# Patient Record
Sex: MALE | Race: ASIAN | Employment: OTHER | URBAN - METROPOLITAN AREA
[De-identification: names, ages, dates, MRNs, and addresses within clinical notes are randomized per-mention and may not be internally consistent; named-entity substitution may affect disease eponyms.]

---

## 2018-05-01 ENCOUNTER — HOSPITAL ENCOUNTER (INPATIENT)
Facility: HOSPITAL | Age: 48
LOS: 1 days | Discharge: HOME | DRG: 247 | End: 2018-05-02
Attending: EMERGENCY MEDICINE | Admitting: HOSPITALIST

## 2018-05-01 ENCOUNTER — APPOINTMENT (OUTPATIENT)
Dept: INTERVENTIONAL RADIOLOGY/VASCULAR | Facility: HOSPITAL | Age: 48
DRG: 247 | End: 2018-05-01
Attending: INTERNAL MEDICINE

## 2018-05-01 ENCOUNTER — APPOINTMENT (OUTPATIENT)
Dept: GENERAL RADIOLOGY | Facility: HOSPITAL | Age: 48
DRG: 247 | End: 2018-05-01
Attending: EMERGENCY MEDICINE

## 2018-05-01 DIAGNOSIS — I20.0 UNSTABLE ANGINA (HCC): Primary | ICD-10-CM

## 2018-05-01 PROCEDURE — B2151ZZ FLUOROSCOPY OF LEFT HEART USING LOW OSMOLAR CONTRAST: ICD-10-PCS | Performed by: INTERNAL MEDICINE

## 2018-05-01 PROCEDURE — 4A023N7 MEASUREMENT OF CARDIAC SAMPLING AND PRESSURE, LEFT HEART, PERCUTANEOUS APPROACH: ICD-10-PCS | Performed by: INTERNAL MEDICINE

## 2018-05-01 PROCEDURE — 99221 1ST HOSP IP/OBS SF/LOW 40: CPT | Performed by: HOSPITALIST

## 2018-05-01 PROCEDURE — 027135Z DILATION OF CORONARY ARTERY, TWO ARTERIES WITH TWO DRUG-ELUTING INTRALUMINAL DEVICES, PERCUTANEOUS APPROACH: ICD-10-PCS | Performed by: INTERNAL MEDICINE

## 2018-05-01 PROCEDURE — 71046 X-RAY EXAM CHEST 2 VIEWS: CPT | Performed by: EMERGENCY MEDICINE

## 2018-05-01 PROCEDURE — B2111ZZ FLUOROSCOPY OF MULTIPLE CORONARY ARTERIES USING LOW OSMOLAR CONTRAST: ICD-10-PCS | Performed by: INTERNAL MEDICINE

## 2018-05-01 RX ORDER — PRASUGREL 10 MG/1
TABLET, FILM COATED ORAL
Status: COMPLETED
Start: 2018-05-01 | End: 2018-05-01

## 2018-05-01 RX ORDER — CLOPIDOGREL BISULFATE 75 MG/1
75 TABLET ORAL DAILY
Status: DISCONTINUED | OUTPATIENT
Start: 2018-05-02 | End: 2018-05-01

## 2018-05-01 RX ORDER — HEPARIN SODIUM AND DEXTROSE 10000; 5 [USP'U]/100ML; G/100ML
INJECTION INTRAVENOUS CONTINUOUS
Status: CANCELLED | OUTPATIENT
Start: 2018-05-01

## 2018-05-01 RX ORDER — HEPARIN SODIUM AND DEXTROSE 10000; 5 [USP'U]/100ML; G/100ML
1000 INJECTION INTRAVENOUS ONCE
Status: COMPLETED | OUTPATIENT
Start: 2018-05-01 | End: 2018-05-01

## 2018-05-01 RX ORDER — NITROGLYCERIN 0.4 MG/1
0.4 TABLET SUBLINGUAL ONCE
Status: COMPLETED | OUTPATIENT
Start: 2018-05-01 | End: 2018-05-01

## 2018-05-01 RX ORDER — HEPARIN SODIUM 5000 [USP'U]/ML
INJECTION, SOLUTION INTRAVENOUS; SUBCUTANEOUS
Status: COMPLETED
Start: 2018-05-01 | End: 2018-05-01

## 2018-05-01 RX ORDER — LIDOCAINE HYDROCHLORIDE 10 MG/ML
INJECTION, SOLUTION EPIDURAL; INFILTRATION; INTRACAUDAL; PERINEURAL
Status: COMPLETED
Start: 2018-05-01 | End: 2018-05-01

## 2018-05-01 RX ORDER — CHLORAL HYDRATE 500 MG
1000 CAPSULE ORAL DAILY
COMMUNITY

## 2018-05-01 RX ORDER — METOPROLOL TARTRATE 5 MG/5ML
INJECTION INTRAVENOUS
Status: COMPLETED
Start: 2018-05-01 | End: 2018-05-01

## 2018-05-01 RX ORDER — SODIUM CHLORIDE 9 MG/ML
INJECTION, SOLUTION INTRAVENOUS CONTINUOUS
Status: ACTIVE | OUTPATIENT
Start: 2018-05-01 | End: 2018-05-02

## 2018-05-01 RX ORDER — METOCLOPRAMIDE HYDROCHLORIDE 5 MG/ML
10 INJECTION INTRAMUSCULAR; INTRAVENOUS EVERY 8 HOURS PRN
Status: DISCONTINUED | OUTPATIENT
Start: 2018-05-01 | End: 2018-05-02

## 2018-05-01 RX ORDER — HEPARIN SODIUM 5000 [USP'U]/ML
5000 INJECTION INTRAVENOUS; SUBCUTANEOUS ONCE
Status: COMPLETED | OUTPATIENT
Start: 2018-05-01 | End: 2018-05-01

## 2018-05-01 RX ORDER — ONDANSETRON 2 MG/ML
4 INJECTION INTRAMUSCULAR; INTRAVENOUS EVERY 6 HOURS PRN
Status: DISCONTINUED | OUTPATIENT
Start: 2018-05-01 | End: 2018-05-02

## 2018-05-01 RX ORDER — PRASUGREL 10 MG/1
10 TABLET, FILM COATED ORAL DAILY
Status: DISCONTINUED | OUTPATIENT
Start: 2018-05-02 | End: 2018-05-02

## 2018-05-01 RX ORDER — ACETAMINOPHEN 325 MG/1
650 TABLET ORAL EVERY 6 HOURS PRN
Status: DISCONTINUED | OUTPATIENT
Start: 2018-05-01 | End: 2018-05-02

## 2018-05-01 RX ORDER — MIDAZOLAM HYDROCHLORIDE 1 MG/ML
INJECTION INTRAMUSCULAR; INTRAVENOUS
Status: COMPLETED
Start: 2018-05-01 | End: 2018-05-01

## 2018-05-01 RX ORDER — ASPIRIN 81 MG/1
324 TABLET, CHEWABLE ORAL ONCE
Status: COMPLETED | OUTPATIENT
Start: 2018-05-01 | End: 2018-05-01

## 2018-05-01 RX ORDER — LISINOPRIL 10 MG/1
10 TABLET ORAL DAILY
Status: DISCONTINUED | OUTPATIENT
Start: 2018-05-02 | End: 2018-05-02

## 2018-05-01 RX ORDER — ATORVASTATIN CALCIUM 40 MG/1
40 TABLET, FILM COATED ORAL NIGHTLY
Status: DISCONTINUED | OUTPATIENT
Start: 2018-05-01 | End: 2018-05-02

## 2018-05-01 RX ORDER — ASPIRIN 325 MG
325 TABLET ORAL DAILY
COMMUNITY
End: 2018-05-01

## 2018-05-01 RX ORDER — ASPIRIN 81 MG/1
81 TABLET, CHEWABLE ORAL DAILY
Status: DISCONTINUED | OUTPATIENT
Start: 2018-05-01 | End: 2018-05-02

## 2018-05-01 RX ORDER — POTASSIUM CHLORIDE 20 MEQ/1
40 TABLET, EXTENDED RELEASE ORAL EVERY 4 HOURS
Status: COMPLETED | OUTPATIENT
Start: 2018-05-01 | End: 2018-05-02

## 2018-05-01 RX ORDER — TETRACYCLINE HCL 500 MG
500 CAPSULE ORAL DAILY
COMMUNITY

## 2018-05-01 NOTE — PLAN OF CARE
NURSING ADMISSION NOTE      Patient admitted via cart  Oriented to room. Safety precautions initiated. Bed in low position. Call light in reach. Pt admitted from cath lab.   SANDRO PLVB OF RCA AND SANDRO DISTAL RCA/PROX PDA  FEMSTOP TO RIGHT GROIN IN PL

## 2018-05-01 NOTE — ED PROVIDER NOTES
Patient Seen in: [facility] Emergency Department    History   Patient presents with:  Dyspnea TERRIE SOB (respiratory)    Stated Complaint: sob    HPI    40-year-old male presents to the emergency department with complaints of initially what he stated Right arm)   Pulse 90   Temp 98.1 °F (36.7 °C) (Oral)   Resp 18   Ht 172.7 cm (5' 8\")   Wt 95.3 kg   SpO2 100%   BMI 31.93 kg/m²         Physical Exam   Constitutional: He is oriented to person, place, and time.  He appears well-developed and well-nourishe EKG    Rate, intervals and axes as noted on EKG Report.   Rate: 100  Rhythm: Sinus Rhythm  Reading: Nonspecific lateral ST changes but no acute elevation no old EKG for comparison           ED Course as of May 01 1744  ----------------------------------

## 2018-05-01 NOTE — ED INITIAL ASSESSMENT (HPI)
Pt here for SOB since last night.  Pt notes that he has intermittent chest pain that he rates as a four

## 2018-05-01 NOTE — H&P
[facility]  History and Physical     Steve Joann Patient Status:  Inpatient    1970 MRN [id number]   [facility] 8NE-A Attending Uzair Briseno MD   Hosp Day # 0 PCP Unknown Pcp     Chief Complaint: Chest pain, SOB    His wheezes. No rhonchi. Cardiovascular: S1, S2. Regular rate and rhythm. No murmurs, rubs or gallops. Equal pulses. Chest and Back: No tenderness or deformity. Abdomen: Soft, nontender, nondistended. Positive bowel sounds.  No rebound, guarding or organom

## 2018-05-01 NOTE — PROCEDURES
[facility] Cardiology      Procedure:  LHC, CORONARY ANGIO, LV ANGIO, SANDRO PLVB OF RCA. SANDRO DISTAL RCA/PROX PDA, PERCLOSE RFA      Findings    LVEF: 60%    LM: PLAQUE    LCx: 95% LARGE PL BRANCH    LAD:  55% MID    RCA:  100% PROX PLVB. 90 % OSTIAL PDA.     RES

## 2018-05-01 NOTE — CONSULTS
[facility] Cardiology Consultation    Olivia Cain Patient Status:  Emergency    1970 MRN [id number]   Location [address] [facility] Attending Concetta Salgado MD   Hosp Day # 0 PCP Unknown Pcp     Reason for Consultation:  Chest pain 14.4   HCT  40.6   PLT  203.0       Chem:  Recent Labs   Lab  05/01/18   1158   NA  138   K  3.3*   CL  104   CO2  25.0   BUN  12   CREATSERUM  0.90   ALT  40   AST  56*   ALB  3.4*       No results for input(s): INR in the last 72 hours.

## 2018-05-02 VITALS
TEMPERATURE: 98 F | WEIGHT: 210 LBS | RESPIRATION RATE: 18 BRPM | DIASTOLIC BLOOD PRESSURE: 65 MMHG | HEART RATE: 82 BPM | SYSTOLIC BLOOD PRESSURE: 107 MMHG | OXYGEN SATURATION: 100 % | BODY MASS INDEX: 31.83 KG/M2 | HEIGHT: 68 IN

## 2018-05-02 PROCEDURE — 99239 HOSP IP/OBS DSCHRG MGMT >30: CPT | Performed by: HOSPITALIST

## 2018-05-02 RX ORDER — METOPROLOL SUCCINATE 25 MG/1
25 TABLET, EXTENDED RELEASE ORAL
Status: DISCONTINUED | OUTPATIENT
Start: 2018-05-02 | End: 2018-05-02

## 2018-05-02 RX ORDER — METOPROLOL SUCCINATE 25 MG/1
25 TABLET, EXTENDED RELEASE ORAL DAILY
Qty: 30 TABLET | Refills: 0 | Status: SHIPPED | OUTPATIENT
Start: 2018-05-02

## 2018-05-02 RX ORDER — ASPIRIN 81 MG/1
81 TABLET, CHEWABLE ORAL DAILY
Qty: 30 TABLET | Refills: 0 | Status: SHIPPED | OUTPATIENT
Start: 2018-05-02

## 2018-05-02 RX ORDER — METOPROLOL SUCCINATE 25 MG/1
25 TABLET, EXTENDED RELEASE ORAL
Qty: 30 TABLET | Refills: 0 | Status: SHIPPED | OUTPATIENT
Start: 2018-05-02 | End: 2018-05-02

## 2018-05-02 RX ORDER — LISINOPRIL 10 MG/1
10 TABLET ORAL DAILY
Qty: 30 TABLET | Refills: 0 | Status: SHIPPED | OUTPATIENT
Start: 2018-05-02 | End: 2018-05-15

## 2018-05-02 RX ORDER — ATORVASTATIN CALCIUM 40 MG/1
40 TABLET, FILM COATED ORAL NIGHTLY
Qty: 30 TABLET | Refills: 0 | Status: SHIPPED | OUTPATIENT
Start: 2018-05-02

## 2018-05-02 RX ORDER — PRASUGREL 10 MG/1
10 TABLET, FILM COATED ORAL DAILY
Qty: 30 TABLET | Refills: 0 | Status: SHIPPED | OUTPATIENT
Start: 2018-05-02

## 2018-05-02 NOTE — PROGRESS NOTES
[facility]  Progress Note     Silvino Brumfield Patient Status:  Inpatient    1970 MRN [id number]   [facility] 8NE-A Attending Camille Gamino MD   Hosp Day # 1 PCP Unknown Pcp     Chief Complaint: CP  S:  Patient denies chest ASA/plavix/effient/statin  2. Cardiology following  3. outpt f/u for circ stent  2.  Hypokalemia, replaced    Oilvia Rose MD

## 2018-05-02 NOTE — PLAN OF CARE
Patient/Family Goals    • Patient/Family Long Term Goal Progressing    • Patient/Family Short Term Goal Progressing          Pt AOx4. On RA, denies SOB. NSR on tele. Right groin soft, c/d/I. Pt denies pain. PRN tylenol for groin tenderness. EKG completed.

## 2018-05-02 NOTE — DIETARY NOTE
Nutrition Short Note    Dietitian consult received per cardiac rehab/CHF protocol. Pt to be educated by cardiac rehab staff and encouraged to attend outpatient classes taught by SORAIDA. SORAIDA available PRN.     Milagros Yuen RD, LDN  Pager #[phone]

## 2018-05-02 NOTE — PAYOR COMM NOTE
--------------  ADMISSION REVIEW         5/1      DIRECT     Procedure:  LHC, CORONARY ANGIO, LV ANGIO, SANDRO PLVB OF RCA.   SANDRO DISTAL RCA/PROX PDA, PERCLOSE RFA        Findings     LVEF: 60%     LM: PLAQUE     LCx: 95% LARGE PL BRANCH     LAD:  55% MID    The patient is on aspirin, effient,  and statin therapy.

## 2018-05-02 NOTE — PROCEDURES
[facility]    PATIENT'S NAME: Frida Pérez   ATTENDING PHYSICIAN: Nelida Sharma M.D. OPERATING PHYSICIAN: Patsy Nicholas M.D.    PATIENT ACCOUNT#:   [de-identified]    LOCATION:  [address] [place] [address]  MEDICAL RECORD #:   [id number]       DATE OF BI There is a large posterolateral branch which has narrowing in its ostium and proximal portion of 90% to 95%. The LAD is a medium-sized vessel which gives rise to multiple small to medium-sized diagonal branches.   The midportion of the LAD is narrowed by a point of a 100% occlusion of the posterior left ventricular branch was left with 0 residual narrowing and MINDI 3 flow. The area of 90% narrowing in the ostium of the PDA was left with 0% occlusion and MINDI 3 flow as well.   At the end of the procedure, the

## 2018-05-02 NOTE — PROGRESS NOTES
5/2/2018    Patient Name: Mahamed Arvizu      To Whom It May Concern: This letter has been written at the patient's request. The above patient was seen at [facility] for treatment of a medical condition from 5/1/2018-5/2/2018.   Please excuse my pa

## 2018-05-02 NOTE — PROGRESS NOTES
[facility] Cardiology Progress Note        Prabhjot Moreno Patient Status:  Inpatient    1970 MRN [id number]   [facility] 8NE-A Attending Chrystal Oppenheim, MD   Hosp Day # 1 PCP Unknown Pcp     Subjective:   The luz 05/01/18   1158  05/02/18   0617   NA  138  138   K  3.3*  3.9   CL  104  105   CO2  25.0  26.0   BUN  12  8   CREATSERUM  0.90  0.81   CA  9.0  8.9   GLU  108*  97       Recent Labs   Lab  05/01/18   1158   ALT  40   AST  56*   ALB  3.4*       Recent Labs

## 2018-05-02 NOTE — PLAN OF CARE
Patient/Family Goals    • Patient/Family Long Term Goal Progressing    • Patient/Family Short Term Goal Progressing        Pt AOx3, SR, lungs CTA, bs present x 4 quadrants. Pt denies CP, SOB, dizziness or lightheadedness. R groin soft, donna CDI.  Pt educati

## 2018-05-03 NOTE — DISCHARGE SUMMARY
[facility]  DISCHARGE SUMMARY     Stevejulieth David Patient Status:  Inpatient    1970 MRN [id number]   [facility] 8NE-A Attending No att. providers found   Hosp Day # 1 PCP Unknown Pcp     Date of Admission: 2018  Date of Stephenie Limon recommendations (brief descriptions):  • Per Brief Synopsis of Hospital Course    Lab/Test results pending at Discharge:   · None     Consultants:  • Cardiology     Discharge Medication List:     Discharge Medications      START taking these medications

## 2018-05-04 ENCOUNTER — TELEPHONE (OUTPATIENT)
Dept: OTHER | Facility: HOSPITAL | Age: 48
End: 2018-05-04

## 2018-05-04 NOTE — PROGRESS NOTES
[facility] Follow up Call  1. How are you doing now that you're home? Patient reports that he is feeling better since he has been home. Denies any swelling, bruising or bleeding at groin puncture site. c/o mild discomfort to site.     2. Since leaving the hos

## 2018-05-14 PROBLEM — Z95.5 S/P RIGHT CORONARY ARTERY (RCA) STENT PLACEMENT: Status: ACTIVE | Noted: 2018-05-14

## 2018-05-14 PROBLEM — I25.10 CORONARY ARTERY DISEASE INVOLVING NATIVE CORONARY ARTERY OF NATIVE HEART WITHOUT ANGINA PECTORIS: Status: ACTIVE | Noted: 2018-05-14

## 2018-05-14 PROBLEM — Z95.5 STATUS POST INSERTION OF DRUG ELUTING CORONARY ARTERY STENT: Status: ACTIVE | Noted: 2018-05-14

## 2021-05-23 ENCOUNTER — APPOINTMENT (OUTPATIENT)
Dept: GENERAL RADIOLOGY | Facility: HOSPITAL | Age: 51
End: 2021-05-23
Attending: EMERGENCY MEDICINE

## 2021-05-23 ENCOUNTER — HOSPITAL ENCOUNTER (EMERGENCY)
Facility: HOSPITAL | Age: 51
Discharge: HOME | End: 2021-05-23
Attending: EMERGENCY MEDICINE

## 2021-05-23 VITALS
HEART RATE: 106 BPM | HEIGHT: 68 IN | WEIGHT: 210 LBS | SYSTOLIC BLOOD PRESSURE: 139 MMHG | BODY MASS INDEX: 31.83 KG/M2 | TEMPERATURE: 99 F | RESPIRATION RATE: 16 BRPM | OXYGEN SATURATION: 97 % | DIASTOLIC BLOOD PRESSURE: 85 MMHG

## 2021-05-23 DIAGNOSIS — S29.012A STRAIN OF THORACIC SPINE: Primary | ICD-10-CM

## 2021-05-23 PROCEDURE — 84484 ASSAY OF TROPONIN QUANT: CPT | Performed by: EMERGENCY MEDICINE

## 2021-05-23 PROCEDURE — 99284 EMERGENCY DEPT VISIT MOD MDM: CPT | Performed by: EMERGENCY MEDICINE

## 2021-05-23 PROCEDURE — 85025 COMPLETE CBC W/AUTO DIFF WBC: CPT | Performed by: EMERGENCY MEDICINE

## 2021-05-23 PROCEDURE — 96372 THER/PROPH/DIAG INJ SC/IM: CPT | Performed by: EMERGENCY MEDICINE

## 2021-05-23 PROCEDURE — 93010 ELECTROCARDIOGRAM REPORT: CPT | Performed by: EMERGENCY MEDICINE

## 2021-05-23 PROCEDURE — 93005 ELECTROCARDIOGRAM TRACING: CPT

## 2021-05-23 PROCEDURE — 36415 COLL VENOUS BLD VENIPUNCTURE: CPT | Performed by: EMERGENCY MEDICINE

## 2021-05-23 PROCEDURE — 71045 X-RAY EXAM CHEST 1 VIEW: CPT | Performed by: EMERGENCY MEDICINE

## 2021-05-23 PROCEDURE — 80053 COMPREHEN METABOLIC PANEL: CPT | Performed by: EMERGENCY MEDICINE

## 2021-05-23 RX ORDER — KETOROLAC TROMETHAMINE 30 MG/ML
30 INJECTION, SOLUTION INTRAMUSCULAR; INTRAVENOUS ONCE
Status: COMPLETED | OUTPATIENT
Start: 2021-05-23 | End: 2021-05-23

## 2021-05-23 RX ORDER — HYDROCODONE BITARTRATE AND ACETAMINOPHEN 5; 325 MG/1; MG/1
1-2 TABLET ORAL EVERY 6 HOURS PRN
Qty: 10 TABLET | Refills: 0 | Status: SHIPPED | OUTPATIENT
Start: 2021-05-23 | End: 2021-05-30

## 2021-05-23 RX ORDER — HYDROCODONE BITARTRATE AND ACETAMINOPHEN 5; 325 MG/1; MG/1
2 TABLET ORAL ONCE
Status: COMPLETED | OUTPATIENT
Start: 2021-05-23 | End: 2021-05-23

## 2021-05-24 NOTE — ED PROVIDER NOTES
Patient Seen in: [facility] Emergency Department      History   Patient presents with:  Back Pain    Stated Complaint: back pain    HPI/Subjective:   HPI    22-year-old male with a history of CAD/stents who comes emergency department with second day edema, normal peripheral pulses   Neuro: Alert oriented and nonfocal         ED Course     Labs Reviewed   COMP METABOLIC PANEL (14) - Abnormal; Notable for the following components:       Result Value    Glucose 112 (*)     A/G Ratio 0.9 (*)     All other presentation is very consistent with thoracic muscle strain. However, because of his cardiac history a troponin was ordered, which is negative. His heart score is a 3, which is low risk.     Radiographic images reviewed and no evidence of cardiopulmonary (six) hours as needed for Pain.   Qty: 10 tablet Refills: 0

## (undated) NOTE — LETTER
Date & Time: 5/23/2021, 7:49 PM  Patient: Marcella iDal  Encounter Provider(s):    Emil Johnson MD       To Whom It May Concern:    Peng Segundo was seen and treated in our department on 5/23/2021. He should not return to work until 5/26/21.     I

## (undated) NOTE — LETTER
05/02/18     Re: Mahamed Arvizu, 4/19/1970      To Whom It May Concern,     Mohammed A Jerris Cranker  was seen today for a post-operative appointment. He may return to work on Monday, May 7th, 2018. Marguerite Boyle   Please do not hesitate to call my office if you have any ques

## (undated) NOTE — LETTER
May 2, 2018       [facility] [id number]       To Whom It May Concern:    Leonardo Manzano has been under our care regarding ongoing medical issues.  Because of this, he has been required to restrict her physical activ